# Patient Record
Sex: FEMALE | Race: WHITE | Employment: FULL TIME | URBAN - NONMETROPOLITAN AREA
[De-identification: names, ages, dates, MRNs, and addresses within clinical notes are randomized per-mention and may not be internally consistent; named-entity substitution may affect disease eponyms.]

---

## 2023-07-14 ASSESSMENT — SOCIAL DETERMINANTS OF HEALTH (SDOH)

## 2023-07-17 ENCOUNTER — OFFICE VISIT (OUTPATIENT)
Dept: OBGYN CLINIC | Age: 38
End: 2023-07-17
Payer: COMMERCIAL

## 2023-07-17 ENCOUNTER — HOSPITAL ENCOUNTER (OUTPATIENT)
Age: 38
Setting detail: SPECIMEN
Discharge: HOME | End: 2023-07-17

## 2023-07-17 VITALS
DIASTOLIC BLOOD PRESSURE: 74 MMHG | SYSTOLIC BLOOD PRESSURE: 123 MMHG | WEIGHT: 242.3 LBS | HEIGHT: 63 IN | BODY MASS INDEX: 42.93 KG/M2

## 2023-07-17 DIAGNOSIS — Z01.419 WOMEN'S ANNUAL ROUTINE GYNECOLOGICAL EXAMINATION: Primary | ICD-10-CM

## 2023-07-17 PROCEDURE — 99385 PREV VISIT NEW AGE 18-39: CPT | Performed by: NURSE PRACTITIONER

## 2023-07-17 RX ORDER — SEMAGLUTIDE 2.4 MG/.75ML
INJECTION, SOLUTION SUBCUTANEOUS
COMMUNITY
Start: 2023-07-10

## 2023-07-17 ASSESSMENT — ENCOUNTER SYMPTOMS
SHORTNESS OF BREATH: 0
DIARRHEA: 0
CONSTIPATION: 0
ABDOMINAL PAIN: 0

## 2023-07-17 NOTE — PROGRESS NOTES
YEARLY PHYSICAL    Date of service: 2023    Zeb Woody  Is a 42 y.o. female    PT's PCP is: RADHA Batista NP     : 1985                                         Chaperone for Intimate Exam  Chaperone was offered as part of the rooming process. Patient declined and agrees to continue with exam without a chaperone. Chaperone: n/a      Subjective:       Patient's last menstrual period was 2023. Are your menses regular: yes    OB History    Para Term  AB Living   5 4 4   1 4   SAB IAB Ectopic Molar Multiple Live Births   1         4      # Outcome Date GA Lbr Bradly/2nd Weight Sex Delivery Anes PTL Lv   5 Term  39w0d   F Vag-Spont   JODY   4 Term  39w0d   M Vag-Spont   JODY   3 Term  39w0d   M Vag-Spont   JODY   2 Term  39w0d   M Vag-Spont   JODY   1 SAB 2000     SAB           Social History     Tobacco Use   Smoking Status Never    Passive exposure: Never   Smokeless Tobacco Never        Social History     Substance and Sexual Activity   Alcohol Use Yes    Comment: Rarely       Family History   Problem Relation Age of Onset    Hypertension Paternal Grandfather     Diabetes Paternal Grandfather     Heart Attack Paternal Grandfather     Diabetes Father     Hypertension Mother     Diabetes Mother     Asthma Brother     Diabetes Sister     Diabetes Sister        Any family history of breast or ovarian cancer: No    Any family history of blood clots: No      Allergies: Patient has no known allergies.       Current Outpatient Medications:     WEGOVY 2.4 MG/0.75ML SOAJ SC injection, , Disp: , Rfl:     Social History     Substance and Sexual Activity   Sexual Activity Yes    Partners: Male       Any bleeding or pain with intercourse: No    Last Yearly:  2020    Last pap: 2018-neg    Last HPV: 2018-neg    Last Mammogram: 7/10/23    Last Dexascan n/a    Last colorectal screen- n/a    Do you do

## 2023-07-20 LAB
HPV I/H RISK 4 DNA CVX QL NAA+PROBE: NOT DETECTED
HPV SAMPLE: NORMAL
HPV, INTERPRETATION: NORMAL
HPV16 DNA CVX QL NAA+PROBE: NOT DETECTED
HPV18 DNA CVX QL NAA+PROBE: NOT DETECTED
SPECIMEN DESCRIPTION: NORMAL

## 2023-07-23 LAB — CYTOLOGY REPORT: NORMAL

## 2024-07-18 ENCOUNTER — OFFICE VISIT (OUTPATIENT)
Dept: OBGYN CLINIC | Age: 39
End: 2024-07-18
Payer: COMMERCIAL

## 2024-07-18 VITALS
BODY MASS INDEX: 41.99 KG/M2 | SYSTOLIC BLOOD PRESSURE: 124 MMHG | HEIGHT: 63 IN | WEIGHT: 237 LBS | DIASTOLIC BLOOD PRESSURE: 76 MMHG

## 2024-07-18 DIAGNOSIS — Z01.419 WOMEN'S ANNUAL ROUTINE GYNECOLOGICAL EXAMINATION: Primary | ICD-10-CM

## 2024-07-18 PROCEDURE — 99396 PREV VISIT EST AGE 40-64: CPT | Performed by: NURSE PRACTITIONER

## 2024-07-18 ASSESSMENT — ENCOUNTER SYMPTOMS
DIARRHEA: 0
SHORTNESS OF BREATH: 0
ABDOMINAL PAIN: 0
CONSTIPATION: 0

## 2024-07-18 NOTE — PROGRESS NOTES
healthmaintenance per patients PCP.    Return in about 1 year (around 7/18/2025) for yearly.     Electronically Signed by RADHA Cole NP

## 2025-07-24 ENCOUNTER — OFFICE VISIT (OUTPATIENT)
Dept: OBGYN CLINIC | Age: 40
End: 2025-07-24
Payer: COMMERCIAL

## 2025-07-24 ENCOUNTER — HOSPITAL ENCOUNTER (OUTPATIENT)
Age: 40
Setting detail: SPECIMEN
Discharge: HOME | End: 2025-07-24

## 2025-07-24 VITALS
BODY MASS INDEX: 48.9 KG/M2 | HEIGHT: 61 IN | SYSTOLIC BLOOD PRESSURE: 122 MMHG | DIASTOLIC BLOOD PRESSURE: 80 MMHG | WEIGHT: 259 LBS

## 2025-07-24 DIAGNOSIS — R23.2 HOT FLASHES: ICD-10-CM

## 2025-07-24 DIAGNOSIS — Z01.419 WOMEN'S ANNUAL ROUTINE GYNECOLOGICAL EXAMINATION: Primary | ICD-10-CM

## 2025-07-24 DIAGNOSIS — R68.82 DECREASED LIBIDO: ICD-10-CM

## 2025-07-24 LAB
25(OH)D3 SERPL-MCNC: 28.5 NG/ML (ref 30–100)
TSH SERPL DL<=0.05 MIU/L-ACNC: 0.79 UIU/ML (ref 0.27–4.2)
VIT B12 SERPL-MCNC: 628 PG/ML (ref 232–1245)

## 2025-07-24 PROCEDURE — 99396 PREV VISIT EST AGE 40-64: CPT | Performed by: NURSE PRACTITIONER

## 2025-08-04 ASSESSMENT — ENCOUNTER SYMPTOMS
CONSTIPATION: 0
SHORTNESS OF BREATH: 0
ABDOMINAL PAIN: 0
DIARRHEA: 0